# Patient Record
Sex: MALE | Race: WHITE | NOT HISPANIC OR LATINO
[De-identification: names, ages, dates, MRNs, and addresses within clinical notes are randomized per-mention and may not be internally consistent; named-entity substitution may affect disease eponyms.]

---
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
## 2020-01-09 ENCOUNTER — TRANSCRIPTION ENCOUNTER (OUTPATIENT)
Age: 20
End: 2020-01-09

## 2020-01-10 ENCOUNTER — EMERGENCY (EMERGENCY)
Facility: HOSPITAL | Age: 20
LOS: 1 days | Discharge: HOME | End: 2020-01-10
Attending: EMERGENCY MEDICINE
Payer: COMMERCIAL

## 2020-01-10 VITALS
TEMPERATURE: 99 F | HEIGHT: 74 IN | DIASTOLIC BLOOD PRESSURE: 100 MMHG | SYSTOLIC BLOOD PRESSURE: 144 MMHG | WEIGHT: 162.04 LBS | RESPIRATION RATE: 20 BRPM | OXYGEN SATURATION: 99 % | HEART RATE: 76 BPM

## 2020-01-10 PROCEDURE — 70450 CT HEAD/BRAIN W/O DYE: CPT | Mod: 26

## 2020-01-10 PROCEDURE — 70450 CT HEAD/BRAIN W/O DYE: CPT

## 2020-01-10 PROCEDURE — 99282 EMERGENCY DEPT VISIT SF MDM: CPT

## 2020-01-10 PROCEDURE — 99284 EMERGENCY DEPT VISIT MOD MDM: CPT | Mod: 25

## 2020-01-10 RX ORDER — ACETAMINOPHEN 500 MG
975 TABLET ORAL ONCE
Refills: 0 | Status: COMPLETED | OUTPATIENT
Start: 2020-01-10 | End: 2020-01-10

## 2020-01-10 RX ADMIN — Medication 975 MILLIGRAM(S): at 18:23

## 2020-01-10 NOTE — ED PROVIDER NOTE - OBJECTIVE STATEMENT
18 y/o with no significant PMHx/PSHx, homosexual, presents to the ED with complaints of frontal and back of head throbbing headache and neck ache x 1 week. Patient also endorsing mild nausea and blurred vision. Denies rhinorrhea, cough, sneezing, weight loss, trauma, syncope, rashes or any other acute complaints. Patient seen at urgent care and was told it was a tension headache. Patient given ibuprofen however when waking up today, patient still had the headache, prompting his visit to the ED.

## 2020-01-10 NOTE — ED PROVIDER NOTE - PATIENT PORTAL LINK FT
You can access the FollowMyHealth Patient Portal offered by [facility] by registering at the following website: [email or web address]. By joining [employer]’s FollowMyHealth portal, you will also be able to view your health information using other applications (apps) compatible with our system.

## 2020-01-10 NOTE — ED ADULT NURSE NOTE - DRUG PRE-SCREENING (DAST -1)
Dual skin assessment completed with Rashad Patino RN indicated the following: no areas of concern noted. Statement Selected

## 2020-01-10 NOTE — ED PROVIDER NOTE - CLINICAL SUMMARY MEDICAL DECISION MAKING FREE TEXT BOX
Likely tension headache vs sinusitis. Will obtain CT scan of head per patient's request and provide Tylenol.

## 2020-06-07 ENCOUNTER — TRANSCRIPTION ENCOUNTER (OUTPATIENT)
Age: 20
End: 2020-06-07

## 2021-03-20 PROBLEM — Z78.9 OTHER SPECIFIED HEALTH STATUS: Chronic | Status: ACTIVE | Noted: 2020-01-10

## 2021-03-24 PROBLEM — Z00.00 ENCOUNTER FOR PREVENTIVE HEALTH EXAMINATION: Status: ACTIVE | Noted: 2021-03-24

## 2021-04-08 ENCOUNTER — APPOINTMENT (OUTPATIENT)
Dept: UROLOGY | Facility: CLINIC | Age: 21
End: 2021-04-08

## 2022-07-04 ENCOUNTER — EMERGENCY (EMERGENCY)
Facility: HOSPITAL | Age: 22
LOS: 1 days | Discharge: HOME | End: 2022-07-04
Attending: EMERGENCY MEDICINE
Payer: COMMERCIAL

## 2022-07-04 VITALS
HEIGHT: 74 IN | OXYGEN SATURATION: 98 % | SYSTOLIC BLOOD PRESSURE: 122 MMHG | TEMPERATURE: 98 F | HEART RATE: 114 BPM | WEIGHT: 178.57 LBS | DIASTOLIC BLOOD PRESSURE: 66 MMHG | RESPIRATION RATE: 17 BRPM

## 2022-07-04 LAB
ALBUMIN SERPL ELPH-MCNC: 4.5 G/DL — SIGNIFICANT CHANGE UP (ref 3.5–5)
ALP SERPL-CCNC: 78 U/L — SIGNIFICANT CHANGE UP (ref 40–120)
ALT FLD-CCNC: 43 U/L DA — SIGNIFICANT CHANGE UP (ref 10–60)
ANION GAP SERPL CALC-SCNC: 11 MMOL/L — SIGNIFICANT CHANGE UP (ref 5–17)
AST SERPL-CCNC: 21 U/L — SIGNIFICANT CHANGE UP (ref 10–40)
BASOPHILS # BLD AUTO: 0.02 K/UL — SIGNIFICANT CHANGE UP (ref 0–0.2)
BASOPHILS NFR BLD AUTO: 0.2 % — SIGNIFICANT CHANGE UP (ref 0–2)
BILIRUB SERPL-MCNC: 1.1 MG/DL — SIGNIFICANT CHANGE UP (ref 0.2–1.2)
BUN SERPL-MCNC: 10 MG/DL — SIGNIFICANT CHANGE UP (ref 7–18)
CALCIUM SERPL-MCNC: 9.7 MG/DL — SIGNIFICANT CHANGE UP (ref 8.4–10.5)
CHLORIDE SERPL-SCNC: 103 MMOL/L — SIGNIFICANT CHANGE UP (ref 96–108)
CO2 SERPL-SCNC: 23 MMOL/L — SIGNIFICANT CHANGE UP (ref 22–31)
CREAT SERPL-MCNC: 1.23 MG/DL — SIGNIFICANT CHANGE UP (ref 0.5–1.3)
EGFR: 85 ML/MIN/1.73M2 — SIGNIFICANT CHANGE UP
EOSINOPHIL # BLD AUTO: 0.01 K/UL — SIGNIFICANT CHANGE UP (ref 0–0.5)
EOSINOPHIL NFR BLD AUTO: 0.1 % — SIGNIFICANT CHANGE UP (ref 0–6)
GLUCOSE SERPL-MCNC: 138 MG/DL — HIGH (ref 70–99)
HCT VFR BLD CALC: 48.5 % — SIGNIFICANT CHANGE UP (ref 39–50)
HGB BLD-MCNC: 17 G/DL — SIGNIFICANT CHANGE UP (ref 13–17)
HIV 1 & 2 AB SERPL IA.RAPID: SIGNIFICANT CHANGE UP
IMM GRANULOCYTES NFR BLD AUTO: 0.3 % — SIGNIFICANT CHANGE UP (ref 0–1.5)
LIDOCAIN IGE QN: 56 U/L — LOW (ref 73–393)
LYMPHOCYTES # BLD AUTO: 0.19 K/UL — LOW (ref 1–3.3)
LYMPHOCYTES # BLD AUTO: 1.6 % — LOW (ref 13–44)
MCHC RBC-ENTMCNC: 31 PG — SIGNIFICANT CHANGE UP (ref 27–34)
MCHC RBC-ENTMCNC: 35.1 GM/DL — SIGNIFICANT CHANGE UP (ref 32–36)
MCV RBC AUTO: 88.5 FL — SIGNIFICANT CHANGE UP (ref 80–100)
MONOCYTES # BLD AUTO: 0.42 K/UL — SIGNIFICANT CHANGE UP (ref 0–0.9)
MONOCYTES NFR BLD AUTO: 3.5 % — SIGNIFICANT CHANGE UP (ref 2–14)
NEUTROPHILS # BLD AUTO: 11.33 K/UL — HIGH (ref 1.8–7.4)
NEUTROPHILS NFR BLD AUTO: 94.3 % — HIGH (ref 43–77)
NRBC # BLD: 0 /100 WBCS — SIGNIFICANT CHANGE UP (ref 0–0)
PLATELET # BLD AUTO: 199 K/UL — SIGNIFICANT CHANGE UP (ref 150–400)
POTASSIUM SERPL-MCNC: 3.7 MMOL/L — SIGNIFICANT CHANGE UP (ref 3.5–5.3)
POTASSIUM SERPL-SCNC: 3.7 MMOL/L — SIGNIFICANT CHANGE UP (ref 3.5–5.3)
PROT SERPL-MCNC: 7.9 G/DL — SIGNIFICANT CHANGE UP (ref 6–8.3)
RBC # BLD: 5.48 M/UL — SIGNIFICANT CHANGE UP (ref 4.2–5.8)
RBC # FLD: 12.3 % — SIGNIFICANT CHANGE UP (ref 10.3–14.5)
SODIUM SERPL-SCNC: 137 MMOL/L — SIGNIFICANT CHANGE UP (ref 135–145)
WBC # BLD: 12.01 K/UL — HIGH (ref 3.8–10.5)
WBC # FLD AUTO: 12.01 K/UL — HIGH (ref 3.8–10.5)

## 2022-07-04 PROCEDURE — 99284 EMERGENCY DEPT VISIT MOD MDM: CPT | Mod: 25

## 2022-07-04 PROCEDURE — 85025 COMPLETE CBC W/AUTO DIFF WBC: CPT

## 2022-07-04 PROCEDURE — 83690 ASSAY OF LIPASE: CPT

## 2022-07-04 PROCEDURE — 36415 COLL VENOUS BLD VENIPUNCTURE: CPT

## 2022-07-04 PROCEDURE — 99284 EMERGENCY DEPT VISIT MOD MDM: CPT

## 2022-07-04 PROCEDURE — 80053 COMPREHEN METABOLIC PANEL: CPT

## 2022-07-04 PROCEDURE — 86703 HIV-1/HIV-2 1 RESULT ANTBDY: CPT

## 2022-07-04 PROCEDURE — 96374 THER/PROPH/DIAG INJ IV PUSH: CPT

## 2022-07-04 RX ORDER — ONDANSETRON 8 MG/1
4 TABLET, FILM COATED ORAL ONCE
Refills: 0 | Status: COMPLETED | OUTPATIENT
Start: 2022-07-04 | End: 2022-07-04

## 2022-07-04 RX ORDER — SODIUM CHLORIDE 9 MG/ML
2000 INJECTION INTRAMUSCULAR; INTRAVENOUS; SUBCUTANEOUS ONCE
Refills: 0 | Status: COMPLETED | OUTPATIENT
Start: 2022-07-04 | End: 2022-07-04

## 2022-07-04 RX ORDER — ONDANSETRON 8 MG/1
1 TABLET, FILM COATED ORAL
Qty: 6 | Refills: 0
Start: 2022-07-04

## 2022-07-04 RX ADMIN — ONDANSETRON 4 MILLIGRAM(S): 8 TABLET, FILM COATED ORAL at 17:10

## 2022-07-04 RX ADMIN — SODIUM CHLORIDE 2000 MILLILITER(S): 9 INJECTION INTRAMUSCULAR; INTRAVENOUS; SUBCUTANEOUS at 17:10

## 2022-07-04 NOTE — ED PROVIDER NOTE - OBJECTIVE STATEMENT
23 yo male presents with vomiting and dairrhea since last night.  Pt states he vomited approx 5 times and and dairrhea approximately 15 times.  His throat is hurting now so his mother is concerned he might have an allergic reaction.  He denies lightheadness.

## 2022-07-04 NOTE — ED PROVIDER NOTE - PATIENT PORTAL LINK FT
You can access the FollowMyHealth Patient Portal offered by  by registering at the following website: [email or web address]. By joining [facility]’s FollowMyHealth portal, you will also be able to view your health information using other applications (apps) compatible with our system.

## 2022-07-04 NOTE — ED PROVIDER NOTE - PHYSICAL EXAMINATION
Talya:   Vitals normal   mild distress  Awake Alert oriented to person, place, situation,   Normocephalic, atraumatic, neck supple   lungs clear bilaterally  heart s1s rrr,  Abdomen soft, nontender, nondistended  No LE swelling    No rash  Neuro exam grossly intact, no weakness, numbness,

## 2022-07-04 NOTE — ED PROVIDER NOTE - NS ED ROS FT
Pt denies fevers, chills  chest pain, palpitations  shortness of breath, orthopnea  dysuria, hematuria   numbness, weakness, saddle anesthesia  rash  enlarged lymph nodes

## 2023-07-05 NOTE — ED ADULT NURSE NOTE - CAS EDP DISCH TYPE
Staging Info: By selecting yes to the question above you will include information on AJCC 8 tumor staging in your Mohs note. Information on tumor staging will be automatically added for SCCs on the head and neck. AJCC 8 includes tumor size, tumor depth, perineural involvement and bone invasion. Home/D/C by DR sandoval

## 2023-08-15 ENCOUNTER — NON-APPOINTMENT (OUTPATIENT)
Age: 23
End: 2023-08-15

## 2024-09-10 ENCOUNTER — NON-APPOINTMENT (OUTPATIENT)
Age: 24
End: 2024-09-10

## 2024-12-18 NOTE — ED ADULT NURSE NOTE - SUICIDE SCREENING QUESTION 2
This medication refill is regarding a MyChart request. Refill requested by patient.    Requested Prescriptions     Pending Prescriptions Disp Refills    amphetamine-dextroamphetamine (ADDERALL, 20MG,) 20 MG tablet 60 tablet 0     Sig: Take 1 tablet by mouth 2 times daily for 30 days. Max Daily Amount: 40 mg     Date of last visit: 8/16/2024   Date of next visit: 2/21/2025  Date of last refill: 11/18/24 #60/0  Pharmacy Name: [facility] [address]    Rx verified, ordered and set to EP.       No

## 2025-01-14 ENCOUNTER — NON-APPOINTMENT (OUTPATIENT)
Age: 25
End: 2025-01-14

## 2025-02-26 ENCOUNTER — NON-APPOINTMENT (OUTPATIENT)
Age: 25
End: 2025-02-26